# Patient Record
Sex: FEMALE | Race: WHITE | NOT HISPANIC OR LATINO
[De-identification: names, ages, dates, MRNs, and addresses within clinical notes are randomized per-mention and may not be internally consistent; named-entity substitution may affect disease eponyms.]

---

## 2020-07-20 ENCOUNTER — TRANSCRIPTION ENCOUNTER (OUTPATIENT)
Age: 20
End: 2020-07-20

## 2020-08-01 ENCOUNTER — TRANSCRIPTION ENCOUNTER (OUTPATIENT)
Age: 20
End: 2020-08-01

## 2021-06-10 ENCOUNTER — APPOINTMENT (OUTPATIENT)
Dept: OTOLARYNGOLOGY | Facility: CLINIC | Age: 21
End: 2021-06-10
Payer: COMMERCIAL

## 2021-06-10 VITALS
TEMPERATURE: 98.1 F | HEART RATE: 68 BPM | HEIGHT: 67 IN | DIASTOLIC BLOOD PRESSURE: 75 MMHG | WEIGHT: 230 LBS | SYSTOLIC BLOOD PRESSURE: 109 MMHG | BODY MASS INDEX: 36.1 KG/M2

## 2021-06-10 DIAGNOSIS — H61.20 IMPACTED CERUMEN, UNSPECIFIED EAR: ICD-10-CM

## 2021-06-10 DIAGNOSIS — H69.80 OTHER SPECIFIED DISORDERS OF EUSTACHIAN TUBE, UNSPECIFIED EAR: ICD-10-CM

## 2021-06-10 PROBLEM — Z00.00 ENCOUNTER FOR PREVENTIVE HEALTH EXAMINATION: Status: ACTIVE | Noted: 2021-06-10

## 2021-06-10 PROCEDURE — 99203 OFFICE O/P NEW LOW 30 MIN: CPT | Mod: 25

## 2021-06-10 PROCEDURE — 92567 TYMPANOMETRY: CPT

## 2021-06-10 PROCEDURE — 99072 ADDL SUPL MATRL&STAF TM PHE: CPT

## 2021-06-10 PROCEDURE — 92557 COMPREHENSIVE HEARING TEST: CPT

## 2021-06-10 NOTE — PHYSICAL EXAM
[Normal] : mucosa is normal [Midline] : trachea located in midline position [de-identified] : RIGHT IMPACTED CERUMEN REMOVED

## 2021-06-10 NOTE — REVIEW OF SYSTEMS
[Ear Itch] : ear itch [Hearing Loss] : hearing loss [Negative] : Heme/Lymph [Patient Intake Form Reviewed] : Patient intake form was reviewed

## 2021-06-10 NOTE — DATA REVIEWED
[de-identified] :  - Type A tymps au (ETF WNL AU)\par - Results obtained via insert earphones revealed hearing wnl 250-8000hz au\par Rec: 1) ENT F/u 2) RE-eval as per MD

## 2022-11-17 ENCOUNTER — NON-APPOINTMENT (OUTPATIENT)
Age: 22
End: 2022-11-17

## 2022-11-17 ENCOUNTER — APPOINTMENT (OUTPATIENT)
Dept: OTOLARYNGOLOGY | Facility: CLINIC | Age: 22
End: 2022-11-17

## 2022-11-17 VITALS
DIASTOLIC BLOOD PRESSURE: 72 MMHG | BODY MASS INDEX: 35.31 KG/M2 | SYSTOLIC BLOOD PRESSURE: 106 MMHG | HEIGHT: 67 IN | HEART RATE: 71 BPM | WEIGHT: 225 LBS

## 2022-11-17 PROCEDURE — 99213 OFFICE O/P EST LOW 20 MIN: CPT

## 2022-11-17 RX ORDER — OFLOXACIN OTIC 3 MG/ML
0.3 SOLUTION AURICULAR (OTIC)
Qty: 5 | Refills: 0 | Status: DISCONTINUED | COMMUNITY
Start: 2022-09-06

## 2022-11-17 RX ORDER — CLINDAMYCIN HYDROCHLORIDE 300 MG/1
300 CAPSULE ORAL
Qty: 21 | Refills: 0 | Status: DISCONTINUED | COMMUNITY
Start: 2022-05-27

## 2022-11-17 NOTE — HISTORY OF PRESENT ILLNESS
[de-identified] : 22 yr old female had swimmer's ear in Sept tx w OTC ear drops.\par +Qtips\par a few days ago c/o clog and pain AD\par started ofloxacin yesterday without relief yet\par -otorrhea, fever

## 2022-11-17 NOTE — PHYSICAL EXAM
[Normal] : mucosa is normal [Midline] : trachea located in midline position [de-identified] : EAC AD red w edema

## 2022-11-22 ENCOUNTER — APPOINTMENT (OUTPATIENT)
Dept: OTOLARYNGOLOGY | Facility: CLINIC | Age: 22
End: 2022-11-22

## 2022-11-22 VITALS
WEIGHT: 250 LBS | DIASTOLIC BLOOD PRESSURE: 79 MMHG | HEIGHT: 66.5 IN | BODY MASS INDEX: 39.7 KG/M2 | HEART RATE: 64 BPM | SYSTOLIC BLOOD PRESSURE: 117 MMHG

## 2022-11-22 DIAGNOSIS — H60.501 UNSPECIFIED ACUTE NONINFECTIVE OTITIS EXTERNA, RIGHT EAR: ICD-10-CM

## 2022-11-22 PROCEDURE — 99213 OFFICE O/P EST LOW 20 MIN: CPT

## 2022-11-22 RX ORDER — AZITHROMYCIN 250 MG/1
250 TABLET, FILM COATED ORAL
Qty: 10 | Refills: 0 | Status: DISCONTINUED | COMMUNITY
Start: 2022-11-17 | End: 2022-11-22

## 2022-11-22 NOTE — HISTORY OF PRESENT ILLNESS
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

## 2022-11-22 NOTE — PHYSICAL EXAM
[Normal] : mucosa is normal [Midline] : trachea located in midline position [de-identified] : min erythema right EAC

## 2022-12-16 ENCOUNTER — APPOINTMENT (OUTPATIENT)
Dept: OBGYN | Facility: CLINIC | Age: 22
End: 2022-12-16

## 2022-12-16 VITALS
BODY MASS INDEX: 40.81 KG/M2 | SYSTOLIC BLOOD PRESSURE: 123 MMHG | WEIGHT: 260 LBS | HEIGHT: 67 IN | DIASTOLIC BLOOD PRESSURE: 89 MMHG

## 2022-12-16 DIAGNOSIS — Z83.3 FAMILY HISTORY OF DIABETES MELLITUS: ICD-10-CM

## 2022-12-16 DIAGNOSIS — Z78.9 OTHER SPECIFIED HEALTH STATUS: ICD-10-CM

## 2022-12-16 PROCEDURE — 99385 PREV VISIT NEW AGE 18-39: CPT

## 2022-12-16 RX ORDER — NORETHINDRONE ACETATE AND ETHINYL ESTRADIOL 1MG-20(21)
1-20 KIT ORAL
Qty: 28 | Refills: 0 | Status: COMPLETED | COMMUNITY
Start: 2022-09-02 | End: 2022-12-16

## 2022-12-16 NOTE — HISTORY OF PRESENT ILLNESS
[TextBox_4] : 21yo presents for initial visit \par hasn’t been to gyn in long time, would like BC - has been getting online - takes junel, likes it  [PapSmeardate] : 3 years

## 2022-12-18 LAB
C TRACH RRNA SPEC QL NAA+PROBE: NOT DETECTED
N GONORRHOEA RRNA SPEC QL NAA+PROBE: NOT DETECTED
SOURCE AMPLIFICATION: NORMAL
SOURCE AMPLIFICATION: NORMAL
T VAGINALIS RRNA SPEC QL NAA+PROBE: NOT DETECTED

## 2023-01-03 LAB — CYTOLOGY CVX/VAG DOC THIN PREP: ABNORMAL

## 2023-01-05 ENCOUNTER — NON-APPOINTMENT (OUTPATIENT)
Age: 23
End: 2023-01-05

## 2024-03-07 ENCOUNTER — LABORATORY RESULT (OUTPATIENT)
Age: 24
End: 2024-03-07

## 2024-03-07 ENCOUNTER — NON-APPOINTMENT (OUTPATIENT)
Age: 24
End: 2024-03-07

## 2024-03-07 ENCOUNTER — APPOINTMENT (OUTPATIENT)
Dept: OBGYN | Facility: CLINIC | Age: 24
End: 2024-03-07
Payer: COMMERCIAL

## 2024-03-07 VITALS
WEIGHT: 207 LBS | SYSTOLIC BLOOD PRESSURE: 130 MMHG | HEIGHT: 67 IN | BODY MASS INDEX: 32.49 KG/M2 | DIASTOLIC BLOOD PRESSURE: 82 MMHG

## 2024-03-07 DIAGNOSIS — Z01.419 ENCOUNTER FOR GYNECOLOGICAL EXAMINATION (GENERAL) (ROUTINE) W/OUT ABNORMAL FINDINGS: ICD-10-CM

## 2024-03-07 PROCEDURE — 99395 PREV VISIT EST AGE 18-39: CPT

## 2024-03-07 RX ORDER — TIRZEPATIDE 7.5 MG/.5ML
INJECTION, SOLUTION SUBCUTANEOUS
Refills: 0 | Status: ACTIVE | COMMUNITY

## 2024-03-07 RX ORDER — NORETHINDRONE ACETATE AND ETHINYL ESTRADIOL AND FERROUS FUMARATE 1MG-20(24)
KIT ORAL
Refills: 0 | Status: COMPLETED | COMMUNITY
End: 2024-03-07

## 2024-03-07 RX ORDER — NORETHINDRONE ACETATE AND ETHINYL ESTRADIOL AND FERROUS FUMARATE 1MG-20(21)
1-20 KIT ORAL
Qty: 84 | Refills: 0 | Status: COMPLETED | COMMUNITY
Start: 2022-12-16 | End: 2024-03-07

## 2024-03-07 NOTE — HISTORY OF PRESENT ILLNESS
[TextBox_4] : 22yo presents for annual exam  no complaints stopped OCP - didnt like how it made her feel and didnt always remember to take it not currently sexually active  [PapSmeardate] : 2022

## 2024-03-07 NOTE — PHYSICAL EXAM
[Chaperone Present] : A chaperone was present in the examining room during all aspects of the physical examination [Appropriately responsive] : appropriately responsive [Alert] : alert [No Acute Distress] : no acute distress [Soft] : soft [Non-distended] : non-distended [Non-tender] : non-tender [Oriented x3] : oriented x3 [Examination Of The Breasts] : a normal appearance [No Masses] : no breast masses were palpable [Labia Majora] : normal [Labia Minora] : normal [Normal] : normal [Uterine Adnexae] : normal

## 2024-03-15 LAB — CYTOLOGY CVX/VAG DOC THIN PREP: ABNORMAL

## 2024-05-30 ENCOUNTER — APPOINTMENT (OUTPATIENT)
Dept: OBGYN | Facility: CLINIC | Age: 24
End: 2024-05-30
Payer: COMMERCIAL

## 2024-05-30 DIAGNOSIS — R87.612 LOW GRADE SQUAMOUS INTRAEPITHELIAL LESION ON CYTOLOGIC SMEAR OF CERVIX (LGSIL): ICD-10-CM

## 2024-05-30 DIAGNOSIS — R87.610 ATYPICAL SQUAMOUS CELLS OF UNDETERMINED SIGNIFICANCE ON CYTOLOGIC SMEAR OF CERVIX (ASC-US): ICD-10-CM

## 2024-05-30 LAB — HCG UR QL: NEGATIVE

## 2024-05-30 PROCEDURE — 81025 URINE PREGNANCY TEST: CPT

## 2024-05-30 PROCEDURE — 57454 BX/CURETT OF CERVIX W/SCOPE: CPT

## 2024-05-30 NOTE — PROCEDURE
[Colposcopy] : Colposcopy  [Risks] : risks [Benefits] : benefits [Alternatives] : alternatives [ASCUS] : ASCUS [Colposcopy Adequate] : colposcopy adequate [SCI Fully Visualized] : SCI fully visualized [ECC Performed] : ECC performed [Hemostasis Obtained] : Hemostasis obtained [Tolerated Well] : the patient tolerated the procedure well [de-identified] : 4 oclock

## 2024-06-06 LAB — CORE LAB BIOPSY: NORMAL

## 2024-06-28 ENCOUNTER — APPOINTMENT (OUTPATIENT)
Dept: OBGYN | Facility: CLINIC | Age: 24
End: 2024-06-28